# Patient Record
Sex: FEMALE | Race: OTHER | NOT HISPANIC OR LATINO | URBAN - METROPOLITAN AREA
[De-identification: names, ages, dates, MRNs, and addresses within clinical notes are randomized per-mention and may not be internally consistent; named-entity substitution may affect disease eponyms.]

---

## 2022-10-09 ENCOUNTER — EMERGENCY (EMERGENCY)
Facility: HOSPITAL | Age: 27
LOS: 1 days | Discharge: HOME | End: 2022-10-09
Admitting: EMERGENCY MEDICINE

## 2022-10-09 VITALS
SYSTOLIC BLOOD PRESSURE: 129 MMHG | RESPIRATION RATE: 15 BRPM | TEMPERATURE: 98 F | OXYGEN SATURATION: 98 % | DIASTOLIC BLOOD PRESSURE: 84 MMHG | HEIGHT: 65 IN | WEIGHT: 139.99 LBS | HEART RATE: 116 BPM

## 2022-10-09 PROCEDURE — 99053 MED SERV 10PM-8AM 24 HR FAC: CPT

## 2022-10-09 PROCEDURE — 73630 X-RAY EXAM OF FOOT: CPT | Mod: 26

## 2022-10-09 PROCEDURE — 99284 EMERGENCY DEPT VISIT MOD MDM: CPT | Mod: 25

## 2022-10-09 PROCEDURE — 73610 X-RAY EXAM OF ANKLE: CPT | Mod: 26

## 2022-10-09 NOTE — ED ADULT TRIAGE NOTE - CHIEF COMPLAINT QUOTE
Pt brought in by EMS after she and her friends had a brownie with 1000 mg of CBD. Pt responsive to painful stimuli.

## 2022-10-10 VITALS
DIASTOLIC BLOOD PRESSURE: 68 MMHG | TEMPERATURE: 99 F | OXYGEN SATURATION: 98 % | SYSTOLIC BLOOD PRESSURE: 101 MMHG | RESPIRATION RATE: 18 BRPM | HEART RATE: 89 BPM

## 2022-10-10 PROCEDURE — 73630 X-RAY EXAM OF FOOT: CPT | Mod: 26,RT

## 2022-10-10 PROCEDURE — 73610 X-RAY EXAM OF ANKLE: CPT | Mod: 26,RT

## 2022-10-10 RX ORDER — ACETAMINOPHEN 500 MG
975 TABLET ORAL ONCE
Refills: 0 | Status: COMPLETED | OUTPATIENT
Start: 2022-10-10 | End: 2022-10-10

## 2022-10-10 RX ADMIN — Medication 975 MILLIGRAM(S): at 04:58

## 2022-10-10 RX ADMIN — Medication 500 MILLIGRAM(S): at 02:48

## 2022-10-10 NOTE — ED PROVIDER NOTE - CARE PLAN
1 Principal Discharge DX:	Cannabis intoxication  Secondary Diagnosis:	Altered mental state   Principal Discharge DX:	Cannabis intoxication  Secondary Diagnosis:	Altered mental state  Secondary Diagnosis:	Ankle sprain

## 2022-10-10 NOTE — ED PROVIDER NOTE - CARE PROVIDER_API CALL
Doc Golden)  Orthopaedic Surgery  [address] [place] [address], [address]  [place] [address]  Phone: [phone]  Fax: [phone]  Follow Up Time:

## 2022-10-10 NOTE — ED PROVIDER NOTE - OBJECTIVE STATEMENT
25 yo F with no known PMHx, BIBA for AMS. Per friend, pt had an edible brownie which contained 1g of CBD. Noted to be altered and difficult to arouse. No apparent trauma, bleeding, respiratory distress, N/V, pain, focal weakness, urinary/bowel incontinence or tremors noted. Pt is altered and unable to provide clinical information currently

## 2022-10-10 NOTE — ED PROVIDER NOTE - NSFOLLOWUPINSTRUCTIONS_ED_ALL_ED_FT
Preventing Marijuana Misuse      Marijuana is a mixture of the dried leaves and flowers of the hemp plant Cannabis sativa. The plant's active ingredients (cannabinoids) change the chemistry of the brain. If you smoke or eat marijuana, you will experience changes in the way you think, feel, and behave.      What is marijuana used for?    In some cases, marijuana is prescribed by a health care provider (medical marijuana) for temporary relief from a medical condition. It can have medical effects, such as:  •Reduced nausea.       •Increased appetite.       •Reduced muscle spasm.       •Pain relief.      •Anxiety relief.      Many people use marijuana for recreational purposes because it helps them relax and puts them in a pleasurable mood (marijuana high).      How can marijuana use affect me?    Marijuana affects you both mentally and physically. Using marijuana can make you feel high and relaxed. It can also have negative effects, both short term and long term. When used for recreational purposes, marijuana is often used in higher doses and for longer periods. High doses of marijuana can cause unpleasant side effects. It is also possible to become addicted to this drug.    Short-term effects of marijuana use include:  •Changes in mood and perception, such as an altered sense of time.      •Increased heart rate.      •Increased appetite.      •Slowed movement, coordination, and reaction time.      •Poor memory, judgment, and problem-solving ability.      •Drowsiness.      •Bloodshot eyes and changes in vision.      •Coughing.      High doses of marijuana can cause:  •Panic.       •Anxiety.       •Mental confusion.      •Severe dizziness.      •Vomiting.      •Hallucinations. This means you see, hear, taste, smell, or feel things that are not real.      •Coma.        What can happen if I keep using marijuana?    If you use marijuana for a long time, it can have long-term effects such as:•Higher risk of health problems, such as:   •Lung and breathing problems.       •Possible higher risk of heart and cardiovascular problems or testicular cancer.        •Mental and physical dependence (addiction).      •Slowed brain development in young people. Babies whose mothers used marijuana during pregnancy may have an increased risk of problems with brain development and behavior.      •Hallucinations or temporary periods of false perceptions or beliefs (paranoia).      •Worsening of mental illness or onset of new mental illness. This can include anxiety, depression, or suicidal thoughts.      •Difficulty maintaining healthy relationships.       •Poor memory and difficulty concentrating and learning. This can result in decreased intelligence, poor performance at school or work, and an increased risk of dropping out of school.      •Higher risk of using other substances like alcohol, nicotine, and illegal drugs.      •A condition called cannabinoid hyperemesis syndrome. This causes repeated episodes of intense abdominal pain, nausea, and vomiting.      Quitting marijuana after using it for a long time can cause withdrawal symptoms, such as:  •Headache.       •Shakiness.       •Cravings for the drug.       •Sweating.       •Stomach pain, nausea, and vomiting.      •Restlessness and trouble sleeping.       •Anxiety, irritability, and anger.       •Decreased appetite.        What are the benefits of not using marijuana?    Not using marijuana can keep you from becoming dependent on it. You can avoid the drug's negative effects on your quality of life. You can avoid accidents caused by the slowed reaction time and decreased thinking ability that are common with marijuana use and abuse. You can also prevent the long-term effects that this drug can cause.      What actions can I take to stop using marijuana?     If you are not physically or mentally dependent on marijuana, you should be able to stop using it on your own. Taking these actions may help:  •Find healthy ways to cope with stress, such as exercise, meditation, or spending time with family and friends. Talk with your health care provider about how you feel and how to cope with stress.       •Spend time with people who do not use marijuana, or make new friends who do not use marijuana.       •Do something else instead of using marijuana. You can exercise, take up a hobby, or participate in activities that you can do with others.       •Do not be afraid to say no if someone offers you marijuana. Speak up about why you do not want to use drugs. You can be a positive role model for others.      If you cannot stop on your own, ask your health care provider for help. Treatment for marijuana addiction is similar to treatment for other addictions. It may include:  •Cognitive-behavioral therapy (psychotherapy). This may include individual or group therapy.      •Joining a support group.       •Treating medical, behavioral, or mental health conditions that exist along with marijuana dependency.        Where to find more information    Learn more about:  •Marijuana from the [place] National [place] on Drug Abuse: www.drugabuse.gov      •Medical marijuana from the National Institutes of Health: nccih.nih.gov      •Treatment options from the Substance Abuse and Mental Health Services Administration: [facility].gov      •Recovery from marijuana dependency from Recovery.org: www.recovery.org        Contact a health care provider if:    •You want to stop using marijuana but you cannot.      •You have withdrawal symptoms when you try to stop using marijuana.      •You are using marijuana every day.      •You need to use increasing amounts of marijuana to get the same desired effect.      •You are using marijuana along with other drugs like cocaine or alcohol.      •You have anxiety or depression.      •You have hallucinations or paranoia.      •Marijuana use is interfering with your relationships or your ability to function normally at school or at work.        Get help right away if:    •You develop severe chest pain, dizziness, or shortness of breath.      •You have severe abdominal pain, nausea, and vomiting.        Summary    •Using marijuana can make you feel high and relaxed, and if used properly, it can have some medical benefits. However, it can also have negative effects, both short term and long term.      •High doses of marijuana can cause unpleasant side effects. These can be both physical and mental.      •Marijuana has the potential to cause physical dependence and even addiction.      •Long-term use may interfere with your ability to function normally at home, school, or work. It can sometimes lead to using other substances like alcohol, nicotine, and illegal drugs.      •If you need help to stop using marijuana, ask your health care provider. Marijuana addiction can be treated Preventing Marijuana Misuse      Marijuana is a mixture of the dried leaves and flowers of the hemp plant Cannabis sativa. The plant's active ingredients (cannabinoids) change the chemistry of the brain. If you smoke or eat marijuana, you will experience changes in the way you think, feel, and behave.      What is marijuana used for?    In some cases, marijuana is prescribed by a health care provider (medical marijuana) for temporary relief from a medical condition. It can have medical effects, such as:  •Reduced nausea.       •Increased appetite.       •Reduced muscle spasm.       •Pain relief.      •Anxiety relief.      Many people use marijuana for recreational purposes because it helps them relax and puts them in a pleasurable mood (marijuana high).      How can marijuana use affect me?    Marijuana affects you both mentally and physically. Using marijuana can make you feel high and relaxed. It can also have negative effects, both short term and long term. When used for recreational purposes, marijuana is often used in higher doses and for longer periods. High doses of marijuana can cause unpleasant side effects. It is also possible to become addicted to this drug.    Short-term effects of marijuana use include:  •Changes in mood and perception, such as an altered sense of time.      •Increased heart rate.      •Increased appetite.      •Slowed movement, coordination, and reaction time.      •Poor memory, judgment, and problem-solving ability.      •Drowsiness.      •Bloodshot eyes and changes in vision.      •Coughing.      High doses of marijuana can cause:  •Panic.       •Anxiety.       •Mental confusion.      •Severe dizziness.      •Vomiting.      •Hallucinations. This means you see, hear, taste, smell, or feel things that are not real.      •Coma.        What can happen if I keep using marijuana?    If you use marijuana for a long time, it can have long-term effects such as:•Higher risk of health problems, such as:   •Lung and breathing problems.       •Possible higher risk of heart and cardiovascular problems or testicular cancer.        •Mental and physical dependence (addiction).      •Slowed brain development in young people. Babies whose mothers used marijuana during pregnancy may have an increased risk of problems with brain development and behavior.      •Hallucinations or temporary periods of false perceptions or beliefs (paranoia).      •Worsening of mental illness or onset of new mental illness. This can include anxiety, depression, or suicidal thoughts.      •Difficulty maintaining healthy relationships.       •Poor memory and difficulty concentrating and learning. This can result in decreased intelligence, poor performance at school or work, and an increased risk of dropping out of school.      •Higher risk of using other substances like alcohol, nicotine, and illegal drugs.      •A condition called cannabinoid hyperemesis syndrome. This causes repeated episodes of intense abdominal pain, nausea, and vomiting.      Quitting marijuana after using it for a long time can cause withdrawal symptoms, such as:  •Headache.       •Shakiness.       •Cravings for the drug.       •Sweating.       •Stomach pain, nausea, and vomiting.      •Restlessness and trouble sleeping.       •Anxiety, irritability, and anger.       •Decreased appetite.        What are the benefits of not using marijuana?    Not using marijuana can keep you from becoming dependent on it. You can avoid the drug's negative effects on your quality of life. You can avoid accidents caused by the slowed reaction time and decreased thinking ability that are common with marijuana use and abuse. You can also prevent the long-term effects that this drug can cause.      What actions can I take to stop using marijuana?     If you are not physically or mentally dependent on marijuana, you should be able to stop using it on your own. Taking these actions may help:  •Find healthy ways to cope with stress, such as exercise, meditation, or spending time with family and friends. Talk with your health care provider about how you feel and how to cope with stress.       •Spend time with people who do not use marijuana, or make new friends who do not use marijuana.       •Do something else instead of using marijuana. You can exercise, take up a hobby, or participate in activities that you can do with others.       •Do not be afraid to say no if someone offers you marijuana. Speak up about why you do not want to use drugs. You can be a positive role model for others.      If you cannot stop on your own, ask your health care provider for help. Treatment for marijuana addiction is similar to treatment for other addictions. It may include:  •Cognitive-behavioral therapy (psychotherapy). This may include individual or group therapy.      •Joining a support group.       •Treating medical, behavioral, or mental health conditions that exist along with marijuana dependency.        Where to find more information    Learn more about:  •Marijuana from the [place] National [place] on Drug Abuse: www.drugabuse.gov      •Medical marijuana from the National Institutes of Health: nccih.nih.gov      •Treatment options from the Substance Abuse and Mental Health Services Administration: [facility].gov      •Recovery from marijuana dependency from Recovery.org: www.recovery.org        Contact a health care provider if:    •You want to stop using marijuana but you cannot.      •You have withdrawal symptoms when you try to stop using marijuana.      •You are using marijuana every day.      •You need to use increasing amounts of marijuana to get the same desired effect.      •You are using marijuana along with other drugs like cocaine or alcohol.      •You have anxiety or depression.      •You have hallucinations or paranoia.      •Marijuana use is interfering with your relationships or your ability to function normally at school or at work.        Get help right away if:    •You develop severe chest pain, dizziness, or shortness of breath.      •You have severe abdominal pain, nausea, and vomiting.        Summary    •Using marijuana can make you feel high and relaxed, and if used properly, it can have some medical benefits. However, it can also have negative effects, both short term and long term.      •High doses of marijuana can cause unpleasant side effects. These can be both physical and mental.      •Marijuana has the potential to cause physical dependence and even addiction.      •Long-term use may interfere with your ability to function normally at home, school, or work. It can sometimes lead to using other substances like alcohol, nicotine, and illegal drugs.      •If you need help to stop using marijuana, ask your health care provider. Marijuana addiction can be treated    Sprain    A sprain is a stretch or tear in one of the tough, fiber-like tissues (ligaments) in your body. This is caused by an injury to the area such as a twisting mechanism. Symptoms include pain, swelling, or bruising. Rest that area over the next several days and slowly resume activity when tolerated. Ice can help with swelling and pain.     SEEK IMMEDIATE MEDICAL CARE IF YOU HAVE ANY OF THE FOLLOWING SYMPTOMS: worsening pain, inability to move that body part, numbness or tingling.

## 2022-10-10 NOTE — ED PROVIDER NOTE - CLINICAL SUMMARY MEDICAL DECISION MAKING FREE TEXT BOX
pt here for AMS secondary to THC/edible ingestion, euglycemic, monitored in the ED with improved mental status, AFVSS, currently ambulatory with steady gait, tolerating PO without N/V, clear speech, without additional medical complaints noted.  Repeat exam and neuro/cranial nerve exams wnl.  No evidence of head/neck trauma. Counseling provided and encouraged drug cessation. Outpatient detox rehab resources provided. Medically stable for d/c. pt here for AMS secondary to THC/edible ingestion, euglycemic, monitored in the ED with improved mental status, AFVSS, currently ambulatory with steady gait, tolerating PO without N/V, clear speech, without additional medical complaints noted.  Repeat exam and neuro/cranial nerve exams wnl.  No evidence of head/neck trauma. given aircast and ACE with crutches for ankle sprain, encouraged RICE and weight bear as tolerated, Outpatient f/u information provided

## 2022-10-10 NOTE — ED ADULT NURSE NOTE - OBJECTIVE STATEMENT
25 y/o female here for eval for AMS s/p ingesting CBD edibles. patient is responsive to painful stimuli. breathing is even and unlabored; pending sobriety and reeval

## 2022-10-10 NOTE — ED ADULT NURSE NOTE - NSSEPSISNEWALTERMENTAL_ED_A_ED
Sciatica    Sciatica is a condition that causes pain in the lower back and down into the buttock, hip, and leg. Sometimes the leg pain can happen without any back pain. Sciatica happens when a spinal nerve is irritated or has pressure put on it as comes out of the spinal canal in the lower back. This most often happens when a bulge or rupture of a nearby spinal disk presses on the nerve. Sciatica can also be caused by a narrowing of the spinal canal (spinal stenosis) or spasm of the muscle in the buttocks that the sciatic nerve passes through (pyriform muscle). Sciatica is also called lumbar radiculopathy.  Sciatica may begin after a sudden twisting or bending force, such as in a car accident. Or it can happen after a simple awkward movement. In either case, muscle spasm often also happens. Muscle spasm makes the pain worse.  A health care provider makes a diagnosis of sciatica from your symptoms and a physical exam. Unless you had an injury from a car accident or fall, you usually won’t have X-rays taken at this time. This is because the nerves and disks in your back can’t be seen on an X-ray. If the provider sees signs of a compressed nerve, you will need to schedule an MRI scan as an outpatient. Signs of a compressed nerve include loss of strength in a leg.  Most sciatica gets better with medicine, exercise, and physical therapy. If your symptoms continue after at least 3 months of medical treatment, you may need surgery.  Home care  Follow these tips when caring for yourself at home:  · You may need to stay in bed the first few days. But as soon as possible, begin sitting or walking. This will help you avoid problems that come from staying in bed for long periods.  · When in bed, try to find a position that is comfortable. A firm mattress is best. Try lying flat on your back with pillows under your knees. You can also try lying on your side with your knees bent up toward your chest and a pillow between your  knees.  · Avoid sitting for long periods. This puts more stress on your lower back than standing or walking.  · Use heat from a hot shower, hot bath, or heating pad to help ease pain. Massage can also help. You can also try using an ice pack. You can make your own ice pack by putting ice cubes in a plastic bag. Wrap the bag in a thin towel. Try both heat and cold to see which works best. Use the method that feels best for 20 minutes several times a day.    · Use safe lifting methods. Don’t lift anything heavier than 15 pounds until all of the pain is gone.  Follow-up care  Follow up with your health care provider if your symptoms don’t start to get better after 1 week. You may need physical therapy or additional tests.  If X-rays were taken, they will be looked at by a radiologist. You will be told of any new findings that may affect your care.  When to seek medical advice  Call your health care provider right away if any of these occur:  · Pain gets worse even after taking prescribed medicine  · Weakness or numbness in 1 or both legs or hips  · Numbness in your groin or genital area  · You can’t control your bowel or bladder  · Fever  · Redness or swelling over your back or spine   © 3154-1374 [employer]. [address] [place] [address], [place] [address]. All rights reserved. This information is not intended as a substitute for professional medical care. Always follow your healthcare professional's instructions.         No

## 2022-10-10 NOTE — ED PROVIDER NOTE - PHYSICAL EXAMINATION
Gen - WDWN F, lethargic, arousable by painful stimuli   Skin - warm, dry, intact  HEENT -  AT/NC, PERRL, no conjunctival injection, pupils 3mm b/l, TM intact with no hemotympanium b/l, no facial contusion or periorbital ecchymosis, o/p clear, uvula midline, airway patent, neck supple with no step off or midline tenderness, FROM  CV - S1S2, R/R/R  Resp - respiration non-labored, CTAB, symmetric bs b/l, no r/r/w  GI - NABS, soft, ND, NT  MS - Moving all extremities, no c/c/e  Neuro - Lethargic, arousable to painful stimuli only Gen - WDWN F, lethargic, arousable by painful stimuli   Skin - warm, dry, intact  HEENT -  AT/NC, PERRL, no conjunctival injection, pupils 3mm b/l, TM intact with no hemotympanium b/l, no facial contusion or periorbital ecchymosis, o/p clear, uvula midline, airway patent, neck supple with no step off or midline tenderness, FROM  CV - S1S2, R/R/R  Resp - respiration non-labored, CTAB, symmetric bs b/l, no r/r/w  GI - NABS, soft, ND, NT  MS - Moving all extremities, no c/c/e, no crepitus or deformity   Neuro - Lethargic, arousable to painful stimuli only

## 2022-10-10 NOTE — ED PROVIDER NOTE - WR INTERPRETATION 1
MSK XR negative - +healed osteophytes to the distal fib region, No acute fracture, No dislocation, No foreign body

## 2022-10-10 NOTE — ED PROVIDER NOTE - PATIENT PORTAL LINK FT
You can access the FollowMyHealth Patient Portal offered by [facility] by registering at the following website: [email or web address]. By joining [employer]’s FollowMyHealth portal, you will also be able to view your health information using other applications (apps) compatible with our system.

## 2022-10-10 NOTE — ED ADULT NURSE NOTE - NSIMPLEMENTINTERV_GEN_ALL_ED
Implemented All Fall Risk Interventions:  [place] to call system. Call bell, personal items and telephone within reach. Instruct patient to call for assistance. Room bathroom lighting operational. Non-slip footwear when patient is off stretcher. Physically safe environment: no spills, clutter or unnecessary equipment. Stretcher in lowest position, wheels locked, appropriate side rails in place. Provide visual cue, wrist band, yellow gown, etc. Monitor gait and stability. Monitor for mental status changes and reorient to person, place, and time. Review medications for side effects contributing to fall risk. Reinforce activity limits and safety measures with patient and family.
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
## 2022-10-13 DIAGNOSIS — S93.401A SPRAIN OF UNSPECIFIED LIGAMENT OF RIGHT ANKLE, INITIAL ENCOUNTER: ICD-10-CM
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
## 2022-10-13 DIAGNOSIS — R41.82 ALTERED MENTAL STATUS, UNSPECIFIED: ICD-10-CM

## 2022-10-13 DIAGNOSIS — F12.120 CANNABIS ABUSE WITH INTOXICATION, UNCOMPLICATED: ICD-10-CM

## 2022-10-13 DIAGNOSIS — F17.200 NICOTINE DEPENDENCE, UNSPECIFIED, UNCOMPLICATED: ICD-10-CM
